# Patient Record
Sex: FEMALE | Race: WHITE
[De-identification: names, ages, dates, MRNs, and addresses within clinical notes are randomized per-mention and may not be internally consistent; named-entity substitution may affect disease eponyms.]

---

## 2022-11-10 ENCOUNTER — HOSPITAL ENCOUNTER (OUTPATIENT)
Dept: HOSPITAL 47 - RADNMMAIN | Age: 37
Discharge: HOME | End: 2022-11-10
Attending: INTERNAL MEDICINE
Payer: COMMERCIAL

## 2022-11-10 DIAGNOSIS — Z87.891: ICD-10-CM

## 2022-11-10 DIAGNOSIS — R00.2: Primary | ICD-10-CM

## 2022-11-10 PROCEDURE — 93017 CV STRESS TEST TRACING ONLY: CPT

## 2022-11-10 NOTE — [place]
Exercise Stress Test Report 

 

 Name:    Liane Dan 

 

 MRN:    [id number] 

 

 Exam Date: 11/10/2022 11:04 

 

 Exam Location:      [place]  

                     Stress 

 

 Ht (in):     66     Wt (lb):     225    BSA:    2.10 

 

 Ordering Phys:       Joanne De La Cruz MD 

 

 Referring Phys:      keren,, 

 

 Technologist:        Tin Cruz 

 

 Age:    37    Gender:    F 

 

 :    1985 

 Procedure CPT: 

 

 Indications:              R07.9 CHEST PAIN 

 

 ICD-10 Codes: 

 

 Patient History:          CHEST PAIN, PALPITATIONS, FORMER SMOKER  

                           QUIT 20 YEARS 

 

 Medications:              ATARAX 

 

 Meds past 24 hrs: 

 

 Pretest Chest Pain: 

 

 STRESS TEST      Karthik 

 

 Protocol 

 

 

 

 

 Exercise Duration (min:sec):         09:00 

 Max ST Depressions (mm): 

 Angina Score: 

 Holden Score: 

 Resting HR (bpm):      98 

 

 Peak HR (bpm):         155 

 

 Resting BP (mmHg):       136    /   71 

 

 Peak BP (mmHg):       172   /   66 

 

 MPHR:    181     Target HR:      154 

 

 % MPHR:     86 

 METS:  10.3 

 

 Total Dose: 

 Peak Dose: 

 Atropine: 

 Double Product:       [id number] 

 

 BP Response: 

 

 Stress Termination: 

 

 Stress Symptoms: 

 

 

 Stress Summary: 

 

 

  ECG ANALYSIS 

 

 Resting ECG: 

 

 Stress ECG: 

 

 CONCLUSIONS 

 Excellent exercise tolerance 

 Normal EKG in response to exercise 

 

 Dr. Delonte Guajardo MD 

 (Electronically Signed) 

 Final Date:      10 November 2022 11:52